# Patient Record
Sex: FEMALE | Race: BLACK OR AFRICAN AMERICAN | URBAN - METROPOLITAN AREA
[De-identification: names, ages, dates, MRNs, and addresses within clinical notes are randomized per-mention and may not be internally consistent; named-entity substitution may affect disease eponyms.]

---

## 2017-09-21 NOTE — PATIENT DISCUSSION
- Normal posterior pole exam (apart from chronic peripheral degenerative changes) without evidence of PVD or Hollenhorst plaques

## 2017-09-21 NOTE — PATIENT DISCUSSION
- Unclear etiology. Differential Dx includes PVD, amaurosis fugax/TIA, presyncopal event, ocular migraine, dry eye syndrome.

## 2017-10-31 NOTE — PATIENT DISCUSSION
DISCUSSED SURGERY OPTIONS WITH PATIENT FOR CLEAR LENS EXCHANGE FOR BETTER VISION, PT IS TO CALL BACK WHEN READY TO PROCEED WITH SURGERY FOR PRE OP MEASURMENTS.

## 2017-10-31 NOTE — PATIENT DISCUSSION
DRY EYES : Discussed with patient the importance of keeping the eye moist and the symptoms associated with dry eyes including blurry vision, tearing, burning, and caterina sensation. Advised patient to minimize use of any fans blowing directly on the face. Advised patient to continue with artificial tears 2-3 times daily.

## 2018-02-13 NOTE — PATIENT DISCUSSION
New Prescription: Pred Forte (prednisolone acetate): drops,suspension: 1% 1 drop four times a day as directed into affected eye 02-

## 2018-02-13 NOTE — PATIENT DISCUSSION
New Prescription: Ocuflox (ofloxacin): drops: 0.3% 1 drop four times a day as directed into affected eye 02-

## 2018-02-13 NOTE — PATIENT DISCUSSION
Patient wishes to scheduled clear lens exchange to improve vision due to being  high Myopic OU. PATIENT PAYING OUT OF POCKET FOR CLEAR LENS EXCHANGE DUE TO CATARACTS NOT BEING VISUALLY SIGNIFICANT.

## 2018-02-22 NOTE — PATIENT DISCUSSION
Continue: Pred Forte (prednisolone acetate): drops,suspension: 1% 1 drop four times a day as directed into affected eye 02-

## 2018-02-22 NOTE — PATIENT DISCUSSION
Continue: Ocuflox (ofloxacin): drops: 0.3% 1 drop four times a day as directed into affected eye 02-

## 2018-02-27 NOTE — PATIENT DISCUSSION
1 WEEK POST-OP, OS-Stop Ocuflox  Continue Acular and  Pred-forte1 drop 4 times a day until follow up . Ceferino Avelar Also given patient instruction sheet.

## 2018-03-13 NOTE — PATIENT DISCUSSION
1 WEEK POST-OP, OD-Stop Ocuflox. Continue acular and Pred-forte, start tapering today. 1 drop 3 time a day for 1 week, 1 drop twice a day for 1 week and 1 drop once a day for 1 week. Then stop. Also given patient instruction sheet. will follow back in a month.

## 2018-04-16 NOTE — PATIENT DISCUSSION
1 MONTH POST-OP CLEAR LENS EXCHANGE- All looks good.  Continue to taper off of Pred-forte as directed

## 2020-09-04 NOTE — PATIENT DISCUSSION
EXPOSED CONJUNCTIVAL CONCRETION SUP CONJ OD: SLIGHT EXPRESSION WITH QTIP. RECOMMENDED ATS. CALL/RTC IF DISCOMFORT PERSISTS.

## 2022-08-25 ENCOUNTER — ESTABLISHED PATIENT (OUTPATIENT)
Facility: LOCATION | Age: 77
End: 2022-08-25

## 2022-08-25 DIAGNOSIS — H43.813: ICD-10-CM

## 2022-08-25 DIAGNOSIS — Z96.1: ICD-10-CM

## 2022-08-25 DIAGNOSIS — H40.013: ICD-10-CM

## 2022-08-25 DIAGNOSIS — H16.223: ICD-10-CM

## 2022-08-25 PROCEDURE — 83861 MICROFLUID ANALY TEARS: CPT

## 2022-08-25 PROCEDURE — 92014 COMPRE OPH EXAM EST PT 1/>: CPT

## 2022-08-25 ASSESSMENT — VISUAL ACUITY
OS_SC: 20/25
OU_SC: 20/25+2
OU_SC: J1
OS_SC: J2
OD_SC: J5
OD_SC: 20/25-2

## 2022-08-25 ASSESSMENT — TONOMETRY
OD_IOP_MMHG: 12
OS_IOP_MMHG: 13

## 2025-06-16 ENCOUNTER — COMPREHENSIVE EXAM (OUTPATIENT)
Age: 80
End: 2025-06-16

## 2025-06-16 DIAGNOSIS — H16.223: ICD-10-CM

## 2025-06-16 DIAGNOSIS — Z96.1: ICD-10-CM

## 2025-06-16 DIAGNOSIS — H52.4: ICD-10-CM

## 2025-06-16 DIAGNOSIS — H52.223: ICD-10-CM

## 2025-06-16 DIAGNOSIS — H43.813: ICD-10-CM

## 2025-06-16 PROCEDURE — 92015KEC REFRACTION KEC

## 2025-06-16 PROCEDURE — 92014 COMPRE OPH EXAM EST PT 1/>: CPT
